# Patient Record
Sex: FEMALE | Race: ASIAN | NOT HISPANIC OR LATINO | URBAN - METROPOLITAN AREA
[De-identification: names, ages, dates, MRNs, and addresses within clinical notes are randomized per-mention and may not be internally consistent; named-entity substitution may affect disease eponyms.]

---

## 2023-12-28 PROBLEM — M85.80 OSTEOPENIA: Status: ACTIVE | Noted: 2023-12-28

## 2023-12-28 PROBLEM — D25.1 INTRAMURAL LEIOMYOMA OF UTERUS: Status: ACTIVE | Noted: 2023-12-28

## 2023-12-28 PROBLEM — N60.12: Status: ACTIVE | Noted: 2023-12-28

## 2023-12-28 PROBLEM — G43.909 MIGRAINE HEADACHE: Status: ACTIVE | Noted: 2023-12-28

## 2023-12-28 PROBLEM — D25.2 SUBSEROSAL LEIOMYOMA OF UTERUS: Status: ACTIVE | Noted: 2023-12-28

## 2023-12-28 PROBLEM — L71.9 ROSACEA: Status: ACTIVE | Noted: 2023-12-28

## 2023-12-28 PROBLEM — N83.299 COMPLEX OVARIAN CYST: Status: ACTIVE | Noted: 2023-12-28

## 2023-12-28 PROBLEM — K64.4 EXTERNAL HEMORRHOIDS WITHOUT COMPLICATION: Status: ACTIVE | Noted: 2023-12-28

## 2023-12-28 RX ORDER — ACETAMINOPHEN 500 MG
2000 TABLET ORAL DAILY
COMMUNITY

## 2023-12-28 RX ORDER — LANOLIN ALCOHOL/MO/W.PET/CERES
400 CREAM (GRAM) TOPICAL
COMMUNITY
Start: 2023-05-25

## 2023-12-28 RX ORDER — FAMOTIDINE 10 MG/1
10 TABLET ORAL DAILY
COMMUNITY

## 2024-08-26 ENCOUNTER — APPOINTMENT (OUTPATIENT)
Dept: OBSTETRICS AND GYNECOLOGY | Facility: CLINIC | Age: 54
End: 2024-08-26

## 2024-08-26 VITALS
WEIGHT: 119 LBS | HEIGHT: 61 IN | DIASTOLIC BLOOD PRESSURE: 77 MMHG | SYSTOLIC BLOOD PRESSURE: 122 MMHG | BODY MASS INDEX: 22.47 KG/M2

## 2024-08-26 DIAGNOSIS — R23.2 HOT FLASHES: ICD-10-CM

## 2024-08-26 DIAGNOSIS — D25.9 UTERINE LEIOMYOMA, UNSPECIFIED LOCATION: ICD-10-CM

## 2024-08-26 DIAGNOSIS — Z12.31 ENCOUNTER FOR SCREENING MAMMOGRAM FOR MALIGNANT NEOPLASM OF BREAST: ICD-10-CM

## 2024-08-26 DIAGNOSIS — R53.83 FATIGUE, UNSPECIFIED TYPE: ICD-10-CM

## 2024-08-26 DIAGNOSIS — Z01.411 ENCNTR FOR GYN EXAM (GENERAL) (ROUTINE) W ABNORMAL FINDINGS: Primary | ICD-10-CM

## 2024-08-26 PROCEDURE — 99396 PREV VISIT EST AGE 40-64: CPT | Performed by: ADVANCED PRACTICE MIDWIFE

## 2024-08-26 PROCEDURE — 3008F BODY MASS INDEX DOCD: CPT | Performed by: ADVANCED PRACTICE MIDWIFE

## 2024-08-26 NOTE — PROGRESS NOTES
"GYNECOLOGY PROGRESS NOTE        CC:  see below. Patient seen for years by  and would have her routine care done by him. He had told her years ago she had a 3+cm fibroid that she should have a hysterectomy for but she never did. She states she then saw a provider at [facility] last year due to 's FDC and she was told there that she did not have fibroid and she has questions about that. She denies pain. Was having yearly sonogram before to check the fibroid and due to use of Clomid to achieve her pregnancy years ago. Patient denies pain but would like fibroid checked again. D/O how fibroids can decreased in size after menopause. Patient has been having menopausel symptoms for many months and the provider at [facility] tried her on estrogen patch and a progestin pill and the progestin seems to cause her irritability and she stopped those. She was then given OCPS and she kept forgetting to take the pills so she just stopped those. She has not had a menses since June 2024 and her symptoms seem to have worsened. She use to have hot flashes at night now she is having them throughout the day. She had mood swings, insomnia, brain fog, joint pain, pain with intercourse, and low sex drive. Pap is up to date 2023 wnl and negative HPV. Abnormal pap 25+years ago. Mammogram is due.   Chief Complaint   Patient presents with    Annual Exam     Windy Camejo is here today for annual exam and is an established patient.   Complaints: none  LAST PAP: 8/8/23 neg  ABN PAP HX: years ago 25+  LAST MAMMOGRAM: 8/31/23 incomplete        HPI:  Windy Camejo is here for a routine GYN examination.  No GYN c/o, no AUB.        Depression screen:  negative      ROS:  GI - no blood in BMs  URO - no hematuria  GYN - no AUB or vaginal discharge  PSYCH - mood OK        PHYSICAL EXAM:  /77 (BP Location: Right arm, Patient Position: Sitting, BP Cuff Size: Adult)   Ht 1.549 m (5' 1\")   Wt 54 kg (119 lb)   LMP 06/03/2024   BMI " 22.48 kg/m²   GEN:  A&O, NAD  URO:  normal urethra, no bladder TTP  GYN:  thinning noted to vulva and perineum w/o lesions or ulcers, normal vagina without discharge or lesions, normal cervix without lesions or discharge or CMT, uterus NT/NE, adnexa mobile and NT/NE  BREAST:  no masses or TTP, no skin lesions or nipple discharge  PSYCH:  normal affect, non-anxious      IMPRESSION/PLAN:    A: normal gyn exam. Symptoms patient is having: hot flashes, joint pain, insomnia, brain fog, decreased sex drive, pain with sex, and mood changes.  Plan: 1. Pap due 2025. 2. Mammogram ordered. 3. Sonogram to recheck fibroid size. 4. Basic labs. 5. Patient may want to consider topical estrogen to help with thinning of the vaginal tissue. 6. Patient may consider non hormonal Rxs for hot flashes/mood.   Problem List Items Addressed This Visit    None  Visit Diagnoses       Encounter for screening mammogram for malignant neoplasm of breast                 Pap and HPV normal in 2023 no Hx of HGSIL, next due in 2025 per patients request to have done very 2 years.   [facility] pap smear screening guidelines reviewed with the patient.        SOPHIA Rahman

## 2024-09-17 ENCOUNTER — HOSPITAL ENCOUNTER (OUTPATIENT)
Dept: RADIOLOGY | Facility: CLINIC | Age: 54
Discharge: HOME | End: 2024-09-17
Payer: COMMERCIAL

## 2024-09-17 ENCOUNTER — LAB (OUTPATIENT)
Dept: LAB | Facility: LAB | Age: 54
End: 2024-09-17
Payer: COMMERCIAL

## 2024-09-17 DIAGNOSIS — Z12.31 ENCOUNTER FOR SCREENING MAMMOGRAM FOR MALIGNANT NEOPLASM OF BREAST: ICD-10-CM

## 2024-09-17 DIAGNOSIS — R23.2 HOT FLASHES: ICD-10-CM

## 2024-09-17 DIAGNOSIS — D25.9 UTERINE LEIOMYOMA, UNSPECIFIED LOCATION: ICD-10-CM

## 2024-09-17 DIAGNOSIS — R53.83 FATIGUE, UNSPECIFIED TYPE: ICD-10-CM

## 2024-09-17 LAB
25(OH)D3 SERPL-MCNC: 55 NG/ML (ref 30–100)
CHOLEST SERPL-MCNC: 187 MG/DL (ref 0–199)
CHOLESTEROL/HDL RATIO: 2.3
ERYTHROCYTE [DISTWIDTH] IN BLOOD BY AUTOMATED COUNT: 13.2 % (ref 11.5–14.5)
FSH SERPL-ACNC: 2.7 IU/L
HCT VFR BLD AUTO: 38.4 % (ref 36–46)
HDLC SERPL-MCNC: 79.6 MG/DL
HGB BLD-MCNC: 12.9 G/DL (ref 12–16)
LDLC SERPL CALC-MCNC: 76 MG/DL
LH SERPL-ACNC: 7.8 IU/L
MCH RBC QN AUTO: 31.5 PG (ref 26–34)
MCHC RBC AUTO-ENTMCNC: 33.6 G/DL (ref 32–36)
MCV RBC AUTO: 94 FL (ref 80–100)
NON HDL CHOLESTEROL: 107 MG/DL (ref 0–149)
NRBC BLD-RTO: 0 /100 WBCS (ref 0–0)
PLATELET # BLD AUTO: 252 X10*3/UL (ref 150–450)
RBC # BLD AUTO: 4.09 X10*6/UL (ref 4–5.2)
TRIGL SERPL-MCNC: 158 MG/DL (ref 0–149)
TSH SERPL-ACNC: 2.26 MIU/L (ref 0.44–3.98)
VLDL: 32 MG/DL (ref 0–40)
WBC # BLD AUTO: 8.3 X10*3/UL (ref 4.4–11.3)

## 2024-09-17 PROCEDURE — 77067 SCR MAMMO BI INCL CAD: CPT

## 2024-09-17 PROCEDURE — 77063 BREAST TOMOSYNTHESIS BI: CPT | Mod: BILATERAL PROCEDURE | Performed by: RADIOLOGY

## 2024-09-17 PROCEDURE — 83002 ASSAY OF GONADOTROPIN (LH): CPT

## 2024-09-17 PROCEDURE — 82672 ASSAY OF ESTROGEN: CPT

## 2024-09-17 PROCEDURE — 76830 TRANSVAGINAL US NON-OB: CPT

## 2024-09-17 PROCEDURE — 84443 ASSAY THYROID STIM HORMONE: CPT

## 2024-09-17 PROCEDURE — 80061 LIPID PANEL: CPT

## 2024-09-17 PROCEDURE — 36415 COLL VENOUS BLD VENIPUNCTURE: CPT

## 2024-09-17 PROCEDURE — 76856 US EXAM PELVIC COMPLETE: CPT | Performed by: RADIOLOGY

## 2024-09-17 PROCEDURE — 76830 TRANSVAGINAL US NON-OB: CPT | Performed by: RADIOLOGY

## 2024-09-17 PROCEDURE — 82306 VITAMIN D 25 HYDROXY: CPT

## 2024-09-17 PROCEDURE — 83036 HEMOGLOBIN GLYCOSYLATED A1C: CPT

## 2024-09-17 PROCEDURE — 77067 SCR MAMMO BI INCL CAD: CPT | Mod: BILATERAL PROCEDURE | Performed by: RADIOLOGY

## 2024-09-17 PROCEDURE — 85027 COMPLETE CBC AUTOMATED: CPT

## 2024-09-17 PROCEDURE — 83001 ASSAY OF GONADOTROPIN (FSH): CPT

## 2024-09-18 LAB
EST. AVERAGE GLUCOSE BLD GHB EST-MCNC: 103 MG/DL
HBA1C MFR BLD: 5.2 %

## 2024-09-22 LAB — ESTROGEN SERPL-MCNC: 2390 PG/ML

## 2024-10-02 DIAGNOSIS — N95.1 MENOPAUSAL SYMPTOMS: Primary | ICD-10-CM

## 2024-10-03 ENCOUNTER — LAB (OUTPATIENT)
Dept: LAB | Facility: LAB | Age: 54
End: 2024-10-03
Payer: COMMERCIAL

## 2024-10-03 DIAGNOSIS — N95.1 MENOPAUSAL SYMPTOMS: ICD-10-CM

## 2024-10-03 PROCEDURE — 36415 COLL VENOUS BLD VENIPUNCTURE: CPT

## 2024-10-03 PROCEDURE — 82670 ASSAY OF TOTAL ESTRADIOL: CPT

## 2024-10-03 PROCEDURE — 82672 ASSAY OF ESTROGEN: CPT

## 2024-10-04 LAB — ESTRADIOL SERPL-MCNC: 150 PG/ML

## 2024-10-10 LAB — ESTROGEN SERPL-MCNC: 370 PG/ML

## 2024-10-30 ENCOUNTER — APPOINTMENT (OUTPATIENT)
Dept: OBSTETRICS AND GYNECOLOGY | Facility: CLINIC | Age: 54
End: 2024-10-30
Payer: COMMERCIAL

## 2024-10-30 VITALS
SYSTOLIC BLOOD PRESSURE: 114 MMHG | WEIGHT: 117.1 LBS | BODY MASS INDEX: 22.11 KG/M2 | DIASTOLIC BLOOD PRESSURE: 70 MMHG | HEIGHT: 61 IN

## 2024-10-30 DIAGNOSIS — N94.9 ADNEXAL CYST: ICD-10-CM

## 2024-10-30 DIAGNOSIS — N84.0 ENDOMETRIAL POLYP: ICD-10-CM

## 2024-10-30 DIAGNOSIS — D21.9 FIBROID: ICD-10-CM

## 2024-10-30 DIAGNOSIS — R93.5 ABNORMAL ENDOMETRIAL ULTRASOUND: Primary | ICD-10-CM

## 2024-10-30 PROBLEM — N83.299 COMPLEX OVARIAN CYST: Status: RESOLVED | Noted: 2023-12-28 | Resolved: 2024-10-30

## 2024-10-30 PROCEDURE — 88305 TISSUE EXAM BY PATHOLOGIST: CPT

## 2024-10-30 PROCEDURE — 88305 TISSUE EXAM BY PATHOLOGIST: CPT | Performed by: STUDENT IN AN ORGANIZED HEALTH CARE EDUCATION/TRAINING PROGRAM

## 2024-10-30 RX ORDER — BUPIVACAINE HYDROCHLORIDE 2.5 MG/ML
10 INJECTION, SOLUTION INFILTRATION; PERINEURAL ONCE
Status: COMPLETED | OUTPATIENT
Start: 2024-10-30 | End: 2024-10-30

## 2024-11-07 LAB
LABORATORY COMMENT REPORT: NORMAL
PATH REPORT.FINAL DX SPEC: NORMAL
PATH REPORT.GROSS SPEC: NORMAL
PATH REPORT.RELEVANT HX SPEC: NORMAL
PATH REPORT.TOTAL CANCER: NORMAL

## 2024-11-20 ENCOUNTER — APPOINTMENT (OUTPATIENT)
Dept: OBSTETRICS AND GYNECOLOGY | Facility: CLINIC | Age: 54
End: 2024-11-20
Payer: COMMERCIAL

## 2024-11-20 DIAGNOSIS — R93.5 ABNORMAL ULTRASOUND OF ENDOMETRIUM: ICD-10-CM

## 2024-11-20 DIAGNOSIS — D21.9 FIBROID: ICD-10-CM

## 2024-11-20 DIAGNOSIS — N93.9 ABNORMAL UTERINE BLEEDING (AUB): Primary | ICD-10-CM

## 2024-11-20 PROCEDURE — 99213 OFFICE O/P EST LOW 20 MIN: CPT | Performed by: OBSTETRICS & GYNECOLOGY

## 2024-11-20 NOTE — PROGRESS NOTES
GYNECOLOGY VIRTUAL VISIT PROGRESS NOTE          CC:     Chief Complaint   Patient presents with    Results     EMB results        HPI:  Windy Camejo is scheduled today for virtual visit to discuss EMB test results.   Audio and Visual communication real-time were utilized and verbal consent was obtained for the encounter.    No new GYN complaints.  Patient relates that she did well after the procedure just a little bit of spotting and no significant pain.  About a week after the hysteroscopy she started a full-blown period which which was much heavier than her normal ones, has been oligomenorrheic recently due to her near menopausal status      ROS:  GYN - see HPI      PHYSICAL EXAM:  VS:  n/a (virtual visit)  GEN:  A&O, NAD  PSYCH:  normal affect, non-anxious    LAB RESULTS:  10/3024- A. Endometrium, biopsy:  -- Secretory pattern endometrium    8/8/23 - pap/HPV wnl        IMPRESSION/PLAN:    Problem List Items Addressed This Visit       Fibroid     Other Visit Diagnoses       Abnormal uterine bleeding (AUB)    -  Primary    Abnormal ultrasound of endometrium              Thickened endometrium:  Noted on US for recheck of uterine fibroid.  Patient is still premenopausal and was coming up on having a period.   S/P hysteroscopic polypectomy.  Path results benign--reviewed with patient (polyp not identified by pathologist, but clinically was a polyp at office H/S).  NO additional treatment needed at this time.  AUB precautions reviewed.    Fibroid:  3.7 cm mural fibroid on recent ultrasound, previously measured 5.3 cm on 2022 US.  No further US recommended for surveillance per this GYN.  No treatment needed this patient is perimenopausal    Paratubal cyst:  RT 2.0 cm simple appearing likely paratubal cyst noted on recent US for recheck of fibroid.  Received measured 4.0 cm on 2022 US>  No follow up US recommended for surveillance per this GYN.  No treatment needed as asymptomatic  F/U PRN or when next preventative GYN  examination due.          Roddy Brown, DO

## 2025-08-25 ENCOUNTER — APPOINTMENT (OUTPATIENT)
Dept: OBSTETRICS AND GYNECOLOGY | Facility: CLINIC | Age: 55
End: 2025-08-25
Payer: COMMERCIAL

## 2025-08-25 VITALS
BODY MASS INDEX: 17.86 KG/M2 | DIASTOLIC BLOOD PRESSURE: 74 MMHG | HEIGHT: 69 IN | SYSTOLIC BLOOD PRESSURE: 119 MMHG | HEART RATE: 59 BPM | WEIGHT: 120.6 LBS

## 2025-08-25 DIAGNOSIS — Z12.31 ENCOUNTER FOR SCREENING MAMMOGRAM FOR MALIGNANT NEOPLASM OF BREAST: ICD-10-CM

## 2025-08-25 DIAGNOSIS — Z01.419 WELL WOMAN EXAM WITH ROUTINE GYNECOLOGICAL EXAM: Primary | ICD-10-CM

## 2025-08-25 DIAGNOSIS — D21.9 FIBROID: ICD-10-CM

## 2025-08-25 DIAGNOSIS — Z12.11 COLON CANCER SCREENING: ICD-10-CM

## 2025-08-25 DIAGNOSIS — N92.0 MENORRHAGIA WITH REGULAR CYCLE: ICD-10-CM

## 2025-08-25 PROCEDURE — 3008F BODY MASS INDEX DOCD: CPT | Performed by: ADVANCED PRACTICE MIDWIFE

## 2025-08-25 PROCEDURE — 99396 PREV VISIT EST AGE 40-64: CPT | Performed by: ADVANCED PRACTICE MIDWIFE

## 2025-08-25 PROCEDURE — 1036F TOBACCO NON-USER: CPT | Performed by: ADVANCED PRACTICE MIDWIFE

## 2025-08-28 LAB
CHOLEST SERPL-MCNC: 216 MG/DL
CHOLEST/HDLC SERPL: 3.1 (CALC)
ERYTHROCYTE [DISTWIDTH] IN BLOOD BY AUTOMATED COUNT: 13 % (ref 11–15)
EST. AVERAGE GLUCOSE BLD GHB EST-MCNC: 114 MG/DL
EST. AVERAGE GLUCOSE BLD GHB EST-SCNC: 6.3 MMOL/L
HBA1C MFR BLD: 5.6 %
HCT VFR BLD AUTO: 39.8 % (ref 35–45)
HDLC SERPL-MCNC: 70 MG/DL
HGB BLD-MCNC: 13.2 G/DL (ref 11.7–15.5)
LDLC SERPL CALC-MCNC: 118 MG/DL (CALC)
MCH RBC QN AUTO: 30.8 PG (ref 27–33)
MCHC RBC AUTO-ENTMCNC: 33.2 G/DL (ref 32–36)
MCV RBC AUTO: 92.8 FL (ref 80–100)
NONHDLC SERPL-MCNC: 146 MG/DL (CALC)
PLATELET # BLD AUTO: 299 THOUSAND/UL (ref 140–400)
PMV BLD REES-ECKER: 10.8 FL (ref 7.5–12.5)
RBC # BLD AUTO: 4.29 MILLION/UL (ref 3.8–5.1)
TRIGL SERPL-MCNC: 162 MG/DL
TSH SERPL-ACNC: 1.53 MIU/L
WBC # BLD AUTO: 4.9 THOUSAND/UL (ref 3.8–10.8)

## 2025-09-01 DIAGNOSIS — N95.1 VASOMOTOR SYMPTOMS DUE TO MENOPAUSE: Primary | ICD-10-CM

## 2025-09-04 LAB
CYTOLOGY CMNT CVX/VAG CYTO-IMP: NORMAL
HPV HR 12 DNA GENITAL QL NAA+PROBE: NEGATIVE
HPV HR GENOTYPES PNL CVX NAA+PROBE: NEGATIVE
HPV16 DNA SPEC QL NAA+PROBE: NEGATIVE
HPV18 DNA SPEC QL NAA+PROBE: NEGATIVE
LAB AP HPV GENOTYPE QUESTION: YES
LAB AP HPV HR: NORMAL
LABORATORY COMMENT REPORT: NORMAL
PATH REPORT.TOTAL CANCER: NORMAL

## 2025-09-29 ENCOUNTER — APPOINTMENT (OUTPATIENT)
Dept: RADIOLOGY | Facility: CLINIC | Age: 55
End: 2025-09-29
Payer: COMMERCIAL